# Patient Record
(demographics unavailable — no encounter records)

---

## 2024-10-31 NOTE — RESULTS/DATA
[TextEntry] : SAMYISABELL PATIENT NAME: Eric Madden Melissa PATIENT PHONE NUMBER: [phone] PATIENT ID: [id number] : 10/21/1979 DATE OF EXAM: 10/24/2022 R. Phys. Name: Zaida Lemons RJayesh Phys. Address: [address] [place] [address] R. Phys. Phone: [phone] CT-CHEST NON CONTRAST  HISTORY: History of asthma with ongoing shortness of breath status post 3 rounds of steroids.  TECHNIQUE: CT of the chest was performed without intravenous contrast. Coronal and sagittal images were reconstructed. This study was performed using automatic exposure control (radiation dose reduction software) to obtain a diagnostic image quality scan with patient dose as low as reasonably achievable. The administered radiation dose was 2.282 mSv.  COMPARISON: Chest radiograph 2020, CT chest 2007  FINDINGS:  LUNGS, AIRWAYS: Stable 4 mm subpleural pulmonary nodule right middle lobe (series 9, image 192). Stable 2 mm pulmonary nodule left upper lobe (series 9, image 107). Stable 3 mm subpleural pulmonary nodule left lower lobe (series 9, image 257). New 4.5 mm groundglass nodule left lower lobe (series 9, image 298). The airways are patent. No significant bronchial wall thickening. There is no emphysematous changes.  PLEURA: There is no pleural effusion or pneumothorax.  HEART AND VESSELS: The heart is normal in size. The aorta and pulmonary arteries are normal in size.  MEDIASTINUM AND SHELL: There are no pathologically enlarged hilar or mediastinal lymph nodes.  THYROID: Partially imaged, unremarkable.  UPPER ABDOMEN: Unremarkable  BONES AND SOFT TISSUES: Unremarkable  IMPRESSION:  1. Multiple stable pulmonary nodules and interval development of 4.5 mm groundglass nodule in the left lower lobe. Recommend follow-up per Fleischner Society criteria.  Fleischner Society Recommendations Incidental Pulmonary Nodule Follow-up  Recommendations for Follow-up and Management of Nodules Smaller than 8 mm Detected Incidentally at Nonscreening CT.  Low Risk Patient: No smoking history, no asbestos exposure, no known or prior cancer, or other risk factors for lung cancer. High Risk Patient: Smoking history, asbestos exposure, any current or prior cancer, or other increased risk factors for lung cancer.  Low-Risk Patient Nodule Size (mm) Less than or equal to 6: No follow-up needed 6-8: Initial follow-up CT at 6-12 months then at 18-24 mo if no change. Greater than 8: Follow-up CT at around 3, 9 and 24 CT, PET/CT, and/or biopsy.   High-Risk Patient Nodule Size (mm) Less than or equal to 6: Follow-up CT at 12 mo; if unchanged, no further follow-up. 6-8: Initial follow-up CT at 3-6 months then at 18-24 mo if no change. Greater than 8: Follow-up CT at around 3, 9 and 24 months CT, PET/CT and/or biopsy.   Signed by: Issac Mccormick Signed Date: 10/30/2022 5:06 PM EDT    SIGNED BY: Issac Mccormick MD, Ext. [phone] 10/30/2022 05:06 PM   ~~~~~~~~~~~~~~~~~~~~~~~~~~~~~~~~~~~~~~~~~~~~~~~~~~~~~~~~~~~~~~~~~~~~~~~~

## 2024-10-31 NOTE — ASSESSMENT
[FreeTextEntry1] : 45F PMH smoker, asthma, PND, GERD, DM on Ozempic, scleroderma who presents for f/u visit.   - Refilled Trelegy, Flonase, DuoNeb, Albuterol HFA - Will resume Montelukast 10mg as she felt it helped in the past - 4.5mm GGN in LLL noted on CT scan from 2022 - Will repeat CT chest now - Lungs clear on exam - She is motivated to quit, is down to 3-4 per day - Will start trial of Nicotrol nasal spray - F/u in 4 mo time with full PFT  The patient expressed understanding and agreement with the plan as outlined above and accepts responsibility to be compliant with any recommended testing, treatment, and follow-up visits.  All relevant questions and concerns were addressed.  35 minutes of time were spent on the encounter. Medical records were reviewed, including but not limited to hospital records, outpatient records, laboratory data, and diagnostic imaging studies. Greater than 50% of the face-to-face encounter time was spent on counseling and/or coordination of care.  Sánchez Sahni MD, [facility] at [place] [address] [place], [place] [address] T: [phone] F: [phone]

## 2024-10-31 NOTE — HISTORY OF PRESENT ILLNESS
[Current] : current [TextBox_4] : 45F PMH smoker, asthma, PND, GERD, DM on Ozempic, scleroderma who presents for f/u visit. She uses Trelegy 100 every day, and albuterol on occasion. She was previously on Montelukast but told to stop, and in replacement started on Flonase. She feels her allergies are 'acting up.' No fevers, no chills. No recent illness or hospitalizations.  [TextBox_11] : 0.5 [TextBox_13] : 20

## 2025-02-27 NOTE — ASSESSMENT
[FreeTextEntry1] : 45F PMH smoker, asthma, PND, GERD, DM on Ozempic, scleroderma who presents for f/u visit.   - Finish course of Prednisone - CT with B/L nodules up to 5mm - Will repeat CT in 1 year - 01/2026 - ordered - Advised to call if not better in 1 week - Will have her return for PFT  The patient expressed understanding and agreement with the plan as outlined above and accepts responsibility to be compliant with any recommended testing, treatment, and follow-up visits.  All relevant questions and concerns were addressed.  20 minutes of time were spent on the encounter. Medical records were reviewed, including but not limited to hospital records, outpatient records, laboratory data, and diagnostic imaging studies. Greater than 50% of the face-to-face encounter time was spent on counseling and/or coordination of care.   Sánchez Sahni MD, [facility] Pulmonary & Critical Care Medicine [facility] at [place] [address] [place], [place] [address] T: [phone] F: [phone]

## 2025-02-27 NOTE — HISTORY OF PRESENT ILLNESS
[Current] : current [TextBox_4] : 45F PMH smoker, asthma, PND, GERD, DM on Ozempic, scleroderma who presents for f/u visit. She is having a respiratory infection now and is on Prednisone. She is taking her albuterol every day, as well as Trelegy. Had recent CT showing nodules up to 5mm.  [TextBox_11] : 0.5 [TextBox_13] : 20

## 2025-02-27 NOTE — RESULTS/DATA
[TextEntry] : SHIRA EXAM: [id number] - CT CHEST  - ORDERED BY: HAN SOTO   PROCEDURE DATE:  01/23/2025    INTERPRETATION:  CLINICAL INFORMATION: Current smoker. Concern for pulmonary nodules.  COMPARISON: Report from CT chest 10/24/2022, no available images for comparison  CONTRAST/COMPLICATIONS: IV Contrast: NONE Oral Contrast: NONE .  PROCEDURE: CT of the Chest was performed. Sagittal and coronal reformats were performed.  FINDINGS:  LUNGS AND LARGE AIRWAYS: Patent central airways. Multiple bilateral subcentimeter nodules, for example: Right middle lobe 4 mm nodule (2:79). Right middle lobe 3 mm nodule (2:72). Left lower lobe 2 mm pleural-based nodule (2:85). Left lower lobe 5 mm groundglass nodule (2:96). Left lower lobe calcified granuloma. PLEURA: No pleural effusion. VESSELS: Within normal limits. HEART: Heart size is normal. No pericardial effusion. MEDIASTINUM AND SHELL: No lymphadenopathy. CHEST WALL AND LOWER NECK: Within normal limits. VISUALIZED UPPER ABDOMEN: Within normal limits. BONES: Within normal limits.  IMPRESSION: A few bilateral lung nodules measure up to 5 mm. One-year follow-up CT recommended.    --- End of Report ---      MARILYN MENA DO; Resident Radiologist This document has been electronically signed. LEYDA AHUJA MD; Attending Radiologist This document has been electronically signed. Jan 27 2025  5:23PM  ~~~~~~~~~~~~~~~~~~~~~~~~~~~~~~~~~~~~~~~~~~~~~~~~~~~~~~~~~~~~~~~~~~~~~~~~

## 2025-02-27 NOTE — REASON FOR VISIT
[Home] : at home, [unfilled] , at the time of the visit. [Medical Office: ([facility])___] : at the medical office located in  [Telephone (audio)] : This telephonic visit was provided via audio only technology. [Follow-Up] : a follow-up visit [Abnormal CXR/ Chest CT] : an abnormal CXR/ chest CT